# Patient Record
Sex: MALE | Race: WHITE | NOT HISPANIC OR LATINO | URBAN - METROPOLITAN AREA
[De-identification: names, ages, dates, MRNs, and addresses within clinical notes are randomized per-mention and may not be internally consistent; named-entity substitution may affect disease eponyms.]

---

## 2021-10-01 ENCOUNTER — EMERGENCY (EMERGENCY)
Facility: HOSPITAL | Age: 56
LOS: 1 days | Discharge: HOME | End: 2021-10-01
Attending: EMERGENCY MEDICINE | Admitting: EMERGENCY MEDICINE
Payer: COMMERCIAL

## 2021-10-01 VITALS
RESPIRATION RATE: 15 BRPM | WEIGHT: 279.99 LBS | DIASTOLIC BLOOD PRESSURE: 85 MMHG | SYSTOLIC BLOOD PRESSURE: 133 MMHG | OXYGEN SATURATION: 100 % | HEIGHT: 72 IN | HEART RATE: 67 BPM

## 2021-10-01 LAB
ALBUMIN SERPL ELPH-MCNC: 4 G/DL — SIGNIFICANT CHANGE UP (ref 3.3–5)
ALP SERPL-CCNC: 70 U/L — SIGNIFICANT CHANGE UP (ref 40–120)
ALT FLD-CCNC: 37 U/L — SIGNIFICANT CHANGE UP (ref 12–78)
ANION GAP SERPL CALC-SCNC: 3 MMOL/L — LOW (ref 5–17)
AST SERPL-CCNC: 22 U/L — SIGNIFICANT CHANGE UP (ref 15–37)
BASOPHILS # BLD AUTO: 0.02 K/UL — SIGNIFICANT CHANGE UP (ref 0–0.2)
BASOPHILS NFR BLD AUTO: 0.2 % — SIGNIFICANT CHANGE UP (ref 0–2)
BILIRUB SERPL-MCNC: 0.4 MG/DL — SIGNIFICANT CHANGE UP (ref 0.2–1.2)
BUN SERPL-MCNC: 18 MG/DL — SIGNIFICANT CHANGE UP (ref 7–23)
CALCIUM SERPL-MCNC: 8.6 MG/DL — SIGNIFICANT CHANGE UP (ref 8.5–10.1)
CHLORIDE SERPL-SCNC: 111 MMOL/L — HIGH (ref 96–108)
CK SERPL-CCNC: 118 U/L — SIGNIFICANT CHANGE UP (ref 26–308)
CO2 SERPL-SCNC: 26 MMOL/L — SIGNIFICANT CHANGE UP (ref 22–31)
CREAT SERPL-MCNC: 0.86 MG/DL — SIGNIFICANT CHANGE UP (ref 0.5–1.3)
D DIMER BLD IA.RAPID-MCNC: <150 NG/ML DDU — SIGNIFICANT CHANGE UP
EOSINOPHIL # BLD AUTO: 0.01 K/UL — SIGNIFICANT CHANGE UP (ref 0–0.5)
EOSINOPHIL NFR BLD AUTO: 0.1 % — SIGNIFICANT CHANGE UP (ref 0–6)
GLUCOSE SERPL-MCNC: 139 MG/DL — HIGH (ref 70–99)
HCT VFR BLD CALC: 39.5 % — SIGNIFICANT CHANGE UP (ref 39–50)
HGB BLD-MCNC: 13.2 G/DL — SIGNIFICANT CHANGE UP (ref 13–17)
IMM GRANULOCYTES NFR BLD AUTO: 0.4 % — SIGNIFICANT CHANGE UP (ref 0–1.5)
LYMPHOCYTES # BLD AUTO: 0.78 K/UL — LOW (ref 1–3.3)
LYMPHOCYTES # BLD AUTO: 8.6 % — LOW (ref 13–44)
MAGNESIUM SERPL-MCNC: 2.2 MG/DL — SIGNIFICANT CHANGE UP (ref 1.6–2.6)
MCHC RBC-ENTMCNC: 30.5 PG — SIGNIFICANT CHANGE UP (ref 27–34)
MCHC RBC-ENTMCNC: 33.4 GM/DL — SIGNIFICANT CHANGE UP (ref 32–36)
MCV RBC AUTO: 91.2 FL — SIGNIFICANT CHANGE UP (ref 80–100)
MONOCYTES # BLD AUTO: 0.36 K/UL — SIGNIFICANT CHANGE UP (ref 0–0.9)
MONOCYTES NFR BLD AUTO: 4 % — SIGNIFICANT CHANGE UP (ref 2–14)
NEUTROPHILS # BLD AUTO: 7.9 K/UL — HIGH (ref 1.8–7.4)
NEUTROPHILS NFR BLD AUTO: 86.7 % — HIGH (ref 43–77)
NRBC # BLD: 0 /100 WBCS — SIGNIFICANT CHANGE UP (ref 0–0)
PLATELET # BLD AUTO: 182 K/UL — SIGNIFICANT CHANGE UP (ref 150–400)
POTASSIUM SERPL-MCNC: 4.8 MMOL/L — SIGNIFICANT CHANGE UP (ref 3.5–5.3)
POTASSIUM SERPL-SCNC: 4.8 MMOL/L — SIGNIFICANT CHANGE UP (ref 3.5–5.3)
PROT SERPL-MCNC: 7.7 G/DL — SIGNIFICANT CHANGE UP (ref 6–8.3)
RBC # BLD: 4.33 M/UL — SIGNIFICANT CHANGE UP (ref 4.2–5.8)
RBC # FLD: 12.5 % — SIGNIFICANT CHANGE UP (ref 10.3–14.5)
SODIUM SERPL-SCNC: 140 MMOL/L — SIGNIFICANT CHANGE UP (ref 135–145)
TROPONIN I SERPL-MCNC: <.015 NG/ML — SIGNIFICANT CHANGE UP (ref 0.01–0.04)
WBC # BLD: 9.11 K/UL — SIGNIFICANT CHANGE UP (ref 3.8–10.5)
WBC # FLD AUTO: 9.11 K/UL — SIGNIFICANT CHANGE UP (ref 3.8–10.5)

## 2021-10-01 PROCEDURE — G1004: CPT

## 2021-10-01 PROCEDURE — 93010 ELECTROCARDIOGRAM REPORT: CPT

## 2021-10-01 PROCEDURE — 93005 ELECTROCARDIOGRAM TRACING: CPT

## 2021-10-01 PROCEDURE — 99284 EMERGENCY DEPT VISIT MOD MDM: CPT | Mod: 25

## 2021-10-01 PROCEDURE — 82550 ASSAY OF CK (CPK): CPT

## 2021-10-01 PROCEDURE — 80053 COMPREHEN METABOLIC PANEL: CPT

## 2021-10-01 PROCEDURE — 84484 ASSAY OF TROPONIN QUANT: CPT

## 2021-10-01 PROCEDURE — 71045 X-RAY EXAM CHEST 1 VIEW: CPT

## 2021-10-01 PROCEDURE — 85379 FIBRIN DEGRADATION QUANT: CPT

## 2021-10-01 PROCEDURE — 83735 ASSAY OF MAGNESIUM: CPT

## 2021-10-01 PROCEDURE — 70450 CT HEAD/BRAIN W/O DYE: CPT | Mod: 26,MG

## 2021-10-01 PROCEDURE — 70450 CT HEAD/BRAIN W/O DYE: CPT | Mod: MG

## 2021-10-01 PROCEDURE — 96374 THER/PROPH/DIAG INJ IV PUSH: CPT

## 2021-10-01 PROCEDURE — 99285 EMERGENCY DEPT VISIT HI MDM: CPT

## 2021-10-01 PROCEDURE — 71045 X-RAY EXAM CHEST 1 VIEW: CPT | Mod: 26

## 2021-10-01 PROCEDURE — 99284 EMERGENCY DEPT VISIT MOD MDM: CPT

## 2021-10-01 PROCEDURE — 85025 COMPLETE CBC W/AUTO DIFF WBC: CPT

## 2021-10-01 PROCEDURE — 36415 COLL VENOUS BLD VENIPUNCTURE: CPT

## 2021-10-01 RX ORDER — MECLIZINE HCL 12.5 MG
25 TABLET ORAL ONCE
Refills: 0 | Status: COMPLETED | OUTPATIENT
Start: 2021-10-01 | End: 2021-10-01

## 2021-10-01 RX ORDER — ONDANSETRON 8 MG/1
4 TABLET, FILM COATED ORAL ONCE
Refills: 0 | Status: COMPLETED | OUTPATIENT
Start: 2021-10-01 | End: 2021-10-01

## 2021-10-01 RX ADMIN — Medication 25 MILLIGRAM(S): at 16:30

## 2021-10-01 RX ADMIN — ONDANSETRON 4 MILLIGRAM(S): 8 TABLET, FILM COATED ORAL at 16:30

## 2021-10-01 NOTE — ED PROVIDER NOTE - PROGRESS NOTE DETAILS
Pt seen by yee Rivera to [facility], for otpt fu.  At length discussion with patient regarding nature of the patient's symptoms. Pt now asymptomatic, no acute co or changes. Discussed results of all diagnostic testing in ED. Discussed chest pain, Shortness of breath, Dizziness, syncope /  near syncope precautions and instructions. Patient demonstrates understanding that a cardiac cause of the symptoms has not been totally excluded, but at this time there is no evidence to warrant an inpatient workup.  Discussed the importance of continued follow-up with Cardiology as outpatient to complete workup.

## 2021-10-01 NOTE — CONSULT NOTE ADULT - SUBJECTIVE AND OBJECTIVE BOX
*****CHARTING IN PROGRESS*****      Patient is a 55y old  Male who presents with a chief complaint of     HPI: 56yo M, with no significant PMH, presents with dizziness and feeling as if he was in a fog. Earlier this morning, around 11am, patient was in his attic when his foot got stuck between beams and his leg slipped slightly through. He denies falling through the attic beams. His presenting symptoms began ~1pm and are associated with multiple episodes of nausea, vomiting, and diaphoresis.      PAST MEDICAL & SURGICAL HISTORY:  No pertinent past medical history        ECHO FINDINGS: No record of previous echo        MEDICATIONS  (STANDING):    MEDICATIONS  (PRN):      FAMILY HISTORY:  Denies Family history of CAD or early MI      Constitutional: denies fever, chills  HEENT: denies blurry vision, difficulty hearing  Respiratory: denies SOB, BARBOSA, cough  Cardiovascular: denies CP, palpitations, orthopnea, PND, LE edema  Gastrointestinal: denies nausea, vomiting, abdominal pain  Genitourinary: denies urinary changes  Skin: Denies rashes, itching  Neurologic: denies headache, weakness, dizziness  Hematology/Oncology: denies bleeding, easy bruising  ROS negative except as noted above      SOCIAL HISTORY:  No tobacco, Alcohol or Drug use    Vital Signs Last 24 Hrs  T(C): --  T(F): --  HR: 67 (01 Oct 2021 15:04) (67 - 67)  BP: 133/85 (01 Oct 2021 15:04) (133/85 - 133/85)  BP(mean): --  RR: 15 (01 Oct 2021 15:04) (15 - 15)  SpO2: 100% (01 Oct 2021 15:04) (100% - 100%)    Physical Exam:  General: Well developed, well nourished, NAD  HEENT: NCAT, PERRLA, EOMI bl, moist mucous membranes   Neck: Supple, nontender, no mass  Neurology: A&Ox3, nonfocal, sensation intact   Respiratory: CTA B/L, No W/R/R  CV: RRR, +S1/S2, no murmurs, rubs or gallops  Abdominal: Soft, NT, ND +BSx4, no palpable masses  Extremities: No C/C/E, + peripheral pulses  MSK: Normal ROM, no joint erythema or warmth, no joint swelling   Heme: No obvious ecchymosis or petechiae   Skin: warm, dry, normal color      ECG:      LABS:  Complete Blood Count + Automated Diff (10.01.21 @ 16:42)   WBC Count: 9.11 K/uL   RBC Count: 4.33 M/uL   Hemoglobin: 13.2 g/dL   Hematocrit: 39.5 %   Mean Cell Volume: 91.2 fl   Mean Cell Hemoglobin: 30.5 pg   Mean Cell Hemoglobin Conc: 33.4 gm/dL   Red Cell Distrib Width: 12.5 %   Platelet Count - Automated: 182 K/uL       RADIOLOGY & ADDITIONAL STUDIES:   *****CHARTING IN PROGRESS*****      Patient is a 55y old  Male who presents with a chief complaint of     HPI: 56yo M, with no significant PMH, presents with dizziness and feeling as if he was in a fog. Earlier this morning, around 11am, patient was in his attic when his foot got stuck between beams and his leg slipped slightly through it. He denies falling through the attic beams. Pt denies any head trauma or LOC. His presenting symptoms began ~1pm and are associated with multiple episodes of nausea, vomiting, and diaphoresis.       PAST MEDICAL & SURGICAL HISTORY:  No pertinent past medical history        ECHO FINDINGS: No record of previous echo        MEDICATIONS  (STANDING):    MEDICATIONS  (PRN):      FAMILY HISTORY:  Denies Family history of CAD or early MI      Constitutional: denies fever, chills, admits diaphoresis   HEENT: denies blurry vision, difficulty hearing  Respiratory: denies SOB, BARBOSA, cough  Cardiovascular: denies CP, palpitations, orthopnea, PND, LE edema  Gastrointestinal: admits nausea, vomiting, denies abdominal pain  Genitourinary: denies urinary changes  Skin: Denies rashes, itching  Neurologic: denies headache, weakness, admits dizziness and lightheadedness  Hematology/Oncology: denies bleeding, easy bruising  ROS negative except as noted above      SOCIAL HISTORY:  No tobacco, Alcohol or Drug use    Vital Signs Last 24 Hrs  T(C): --  T(F): --  HR: 67 (01 Oct 2021 15:04) (67 - 67)  BP: 133/85 (01 Oct 2021 15:04) (133/85 - 133/85)  BP(mean): --  RR: 15 (01 Oct 2021 15:04) (15 - 15)  SpO2: 100% (01 Oct 2021 15:04) (100% - 100%)    Physical Exam:  General: Well developed, well nourished, NAD  HEENT: NCAT, PERRLA, EOMI bl, moist mucous membranes   Neck: Supple, nontender, no mass  Neurology: A&Ox3, nonfocal, sensation intact   Respiratory: CTA B/L, No W/R/R  CV: RRR, +S1/S2, no murmurs, rubs or gallops  Abdominal: Soft, NT, ND +BSx4, no palpable masses  Extremities: No C/C/E, + peripheral pulses  MSK: Normal ROM, no joint erythema or warmth, no joint swelling   Heme: No obvious ecchymosis or petechiae   Skin: warm, dry, normal color      ECG: NSR, HR: 69, no signs of ischemia       LABS:  Complete Blood Count + Automated Diff (10.01.21 @ 16:42)   WBC Count: 9.11 K/uL   RBC Count: 4.33 M/uL   Hemoglobin: 13.2 g/dL   Hematocrit: 39.5 %   Mean Cell Volume: 91.2 fl   Mean Cell Hemoglobin: 30.5 pg   Mean Cell Hemoglobin Conc: 33.4 gm/dL   Red Cell Distrib Width: 12.5 %   Platelet Count - Automated: 182 K/uL       RADIOLOGY & ADDITIONAL STUDIES:   *****CHARTING IN PROGRESS*****      Patient is a 55y old  Male who presents with a chief complaint of     HPI: 56yo M, with no significant PMH, presents with dizziness and feeling as if he was in a fog. Earlier this morning, around 11am, patient was in his attic when his foot got stuck between beams and his leg slipped slightly through it. He denies falling through the attic beams. Pt denies any head trauma or LOC. His presenting symptoms began ~1pm and are associated with multiple episodes of nausea, vomiting, and diaphoresis.       PAST MEDICAL & SURGICAL HISTORY:  No pertinent past medical history        ECHO FINDINGS: No record of previous echo        MEDICATIONS  (STANDING):    MEDICATIONS  (PRN):      FAMILY HISTORY:  Denies Family history of CAD or early MI      Constitutional: denies fever, chills, admits diaphoresis   HEENT: denies blurry vision, difficulty hearing  Respiratory: denies SOB, BARBOSA, cough  Cardiovascular: denies CP, palpitations, orthopnea, PND, LE edema  Gastrointestinal: admits nausea, vomiting, denies abdominal pain  Genitourinary: denies urinary changes  Skin: Denies rashes, itching  Neurologic: denies headache, weakness, admits dizziness and lightheadedness  Hematology/Oncology: denies bleeding, easy bruising  ROS negative except as noted above      SOCIAL HISTORY:  No tobacco, Alcohol or Drug use    Vital Signs Last 24 Hrs  T(C): --  T(F): --  HR: 67 (01 Oct 2021 15:04) (67 - 67)  BP: 133/85 (01 Oct 2021 15:04) (133/85 - 133/85)  BP(mean): --  RR: 15 (01 Oct 2021 15:04) (15 - 15)  SpO2: 100% (01 Oct 2021 15:04) (100% - 100%)    Physical Exam:  General: Well developed, well nourished, NAD  HEENT: NCAT, PERRLA, EOMI bl, moist mucous membranes   Neck: Supple, nontender, no mass  Neurology: A&Ox3, nonfocal, sensation intact   Respiratory: CTA B/L, No W/R/R  CV: RRR, +S1/S2, no murmurs, rubs or gallops  Abdominal: Soft, NT, ND +BSx4, no palpable masses  Extremities: No C/C/E, + peripheral pulses  MSK: Normal ROM, no joint erythema or warmth, no joint swelling   Heme: No obvious ecchymosis or petechiae   Skin: warm, dry, normal color      ECG: NSR, HR: 69, no signs of ischemia       LABS:  Complete Blood Count + Automated Diff (10.01.21 @ 16:42)   WBC Count: 9.11 K/uL   RBC Count: 4.33 M/uL   Hemoglobin: 13.2 g/dL   Hematocrit: 39.5 %   Mean Cell Volume: 91.2 fl   Mean Cell Hemoglobin: 30.5 pg   Mean Cell Hemoglobin Conc: 33.4 gm/dL   Red Cell Distrib Width: 12.5 %   Platelet Count - Automated: 182 K/uL   Comprehensive Metabolic Panel (10.01.21 @ 16:42)   Sodium, Serum: 140 mmol/L   Potassium, Serum: 4.8 mmol/L   Chloride, Serum: 111 mmol/L   Carbon Dioxide, Serum: 26 mmol/L   Anion Gap, Serum: 3 mmol/L   Blood Urea Nitrogen, Serum: 18 mg/dL   Creatinine, Serum: 0.86 mg/dL   Glucose, Serum: 139 mg/dL   Calcium, Total Serum: 8.6 mg/dL   Protein Total, Serum: 7.7 g/dL   Albumin, Serum: 4.0 g/dL   Bilirubin Total, Serum: 0.4 mg/dL   Alkaline Phosphatase, Serum: 70 U/L   Aspartate Aminotransferase (AST/SGOT): 22 U/L   Alanine Aminotransferase (ALT/SGPT): 37 U/L   eGFR if Non : 99  eGFR if : 115 mL/min/1.73M2     Troponin I, Serum: <.015      RADIOLOGY & ADDITIONAL STUDIES:   *****CHARTING IN PROGRESS*****      Patient is a 55y old  Male who presents with a chief complaint of     HPI: 56yo M, with no significant PMH, presents with dizziness and feeling as if he was in a fog. Earlier this morning, around 11am, patient was in his attic when his foot got stuck between beams and his leg slipped slightly through it. He denies falling through the attic beams. Pt denies any head trauma or LOC. His presenting symptoms began ~1pm and are associated with multiple episodes of nausea, vomiting, and diaphoresis.       PAST MEDICAL & SURGICAL HISTORY:  No pertinent past medical history        ECHO FINDINGS: No record of previous echo        MEDICATIONS  (STANDING):    MEDICATIONS  (PRN):      FAMILY HISTORY:  Denies Family history of CAD or early MI      Constitutional: denies fever, chills, admits diaphoresis   HEENT: denies blurry vision, difficulty hearing  Respiratory: denies SOB, BARBOSA, cough  Cardiovascular: denies CP, palpitations, orthopnea, PND, LE edema  Gastrointestinal: admits nausea, vomiting, denies abdominal pain  Genitourinary: denies urinary changes  Skin: Denies rashes, itching  Neurologic: denies headache, weakness, admits dizziness and lightheadedness  Hematology/Oncology: denies bleeding, easy bruising  ROS negative except as noted above      SOCIAL HISTORY:  No tobacco, Alcohol or Drug use    Vital Signs Last 24 Hrs  T(C): --  T(F): --  HR: 67 (01 Oct 2021 15:04) (67 - 67)  BP: 133/85 (01 Oct 2021 15:04) (133/85 - 133/85)  BP(mean): --  RR: 15 (01 Oct 2021 15:04) (15 - 15)  SpO2: 100% (01 Oct 2021 15:04) (100% - 100%)    Physical Exam:  General: Well developed, well nourished, NAD  HEENT: NCAT, PERRLA, EOMI bl, moist mucous membranes   Neck: Supple, nontender, no mass  Neurology: A&Ox3, nonfocal, sensation intact   Respiratory: CTA B/L, No W/R/R  CV: RRR, +S1/S2, no murmurs, rubs or gallops  Abdominal: Soft, NT, ND +BSx4, no palpable masses  Extremities: No C/C/E, + peripheral pulses  MSK: Normal ROM, no joint erythema or warmth, no joint swelling   Heme: No obvious ecchymosis or petechiae   Skin: warm, dry, normal color      ECG: NSR, HR: 69, no signs of ischemia       LABS:  Complete Blood Count + Automated Diff (10.01.21 @ 16:42)   WBC Count: 9.11 K/uL   RBC Count: 4.33 M/uL   Hemoglobin: 13.2 g/dL   Hematocrit: 39.5 %   Mean Cell Volume: 91.2 fl   Mean Cell Hemoglobin: 30.5 pg   Mean Cell Hemoglobin Conc: 33.4 gm/dL   Red Cell Distrib Width: 12.5 %   Platelet Count - Automated: 182 K/uL   Comprehensive Metabolic Panel (10.01.21 @ 16:42)   Sodium, Serum: 140 mmol/L   Potassium, Serum: 4.8 mmol/L   Chloride, Serum: 111 mmol/L   Carbon Dioxide, Serum: 26 mmol/L   Anion Gap, Serum: 3 mmol/L   Blood Urea Nitrogen, Serum: 18 mg/dL   Creatinine, Serum: 0.86 mg/dL   Glucose, Serum: 139 mg/dL   Calcium, Total Serum: 8.6 mg/dL   Protein Total, Serum: 7.7 g/dL   Albumin, Serum: 4.0 g/dL   Bilirubin Total, Serum: 0.4 mg/dL   Alkaline Phosphatase, Serum: 70 U/L   Aspartate Aminotransferase (AST/SGOT): 22 U/L   Alanine Aminotransferase (ALT/SGPT): 37 U/L   eGFR if Non : 99  eGFR if : 115 mL/min/1.73M2     Troponin I, Serum: <.015      RADIOLOGY & ADDITIONAL STUDIES:    < from: CT Head No Cont (10.01.21 @ 16:52) >  EXAM:  CT BRAIN                            PROCEDURE DATE:  10/01/2021          INTERPRETATION:  CLINICAL INDICATIONS:  Dizziness, non-specific    COMPARISON: None.    TECHNIQUE: Noncontrast CT of the head. Multiplanar reformations are submitted.    FINDINGS:  There is no compelling evidence for an acute transcortical infarction. There is no evidence of mass, mass effect, midline shift or extra-axial fluid collection. The lateral ventricles and cortical sulci are age-appropriate in size and configuration. The orbits, mastoid air cells and visualized paranasal sinuses are unremarkable. The calvarium is intact. Consider MRI as clinically warranted.    IMPRESSION: No evidence of an acute transcortical infarction or hemorrhage.    --- End of Report ---      < end of copied text >     *****CHARTING IN PROGRESS*****      Patient is a 55y old  Male who presents with a chief complaint of dizziness.     HPI: 56yo M, with no significant PMH, presents with the feeling as if he was "in a fog" and in a "drunk state." Earlier this morning, around 11am, patient was in his attic when his foot got stuck between beams and his leg slipped slightly through it. He denies falling through the attic beams. Pt denies any head trauma or LOC. He then started fixing the attic. Around 1pm while resting on his couch he felt "this daze state" with nausea, vomiting x3 and diaphoresis. Pt's wife witnessed this and state that the patient was very diaphoretic and looked very pale. Wife checked pt's BP which was elevated 160/90. His SBP is usually between 120-130s. This sensation was worse when he turned his head. When he tried to lay down he felt nauseous. Denies any dizziness, lightheadedness or vision changes. Pt has been staying hydrated, no changes in diet recently.   Pt does not follow a cardiologist, had a stress test in 2012 when he had his gastric sleeve done which was negative. Pt does note that he has been more stressed than usual, he is helping move his daughter to [place] tomorrow and he was very upset when the attic broke this morning. Pt denies any CP, SOB, palpitations, numbness/tingling, headache, dizziness, lower leg swelling, fever, chills.   No sick contacts no recent travels.   Pt was given Meclizine 25 mg x1 and Zofran 4 mg IVP and states that his symptoms have improved.       PAST MEDICAL & SURGICAL HISTORY:  No pertinent past medical history        ECHO FINDINGS: No record of previous echo        MEDICATIONS  (STANDING):    MEDICATIONS  (PRN):      FAMILY HISTORY:  Denies Family history of CAD or early MI      Constitutional: denies fever, chills, admits diaphoresis   HEENT: denies blurry vision, difficulty hearing  Respiratory: denies SOB, BARBOSA, cough  Cardiovascular: denies CP, palpitations, orthopnea, PND, LE edema  Gastrointestinal: admits nausea, vomiting, denies abdominal pain  Genitourinary: denies urinary changes  Skin: Denies rashes, itching  Neurologic: denies headache, weakness, dizziness and lightheadedness, admits "fog state of mind"   Hematology/Oncology: denies bleeding, easy bruising  ROS negative except as noted above      SOCIAL HISTORY:  No tobacco, Alcohol or Drug use    Vital Signs Last 24 Hrs  T(C): --  T(F): --  HR: 67 (01 Oct 2021 15:04) (67 - 67)  BP: 133/85 (01 Oct 2021 15:04) (133/85 - 133/85)  BP(mean): --  RR: 15 (01 Oct 2021 15:04) (15 - 15)  SpO2: 100% (01 Oct 2021 15:04) (100% - 100%)    Physical Exam:  General: Well developed, well nourished, NAD, pleasant male   HEENT: NCAT, PERRLA, EOMI bl, moist mucous membranes   Neck: Supple, nontender, no mass  Neurology: A&Ox3, nonfocal, sensation intact   Respiratory: CTA B/L, No W/R/R  CV: RRR, +S1/S2, no murmurs, rubs or gallops  Abdominal: Soft, NT, ND +BSx4, no palpable masses  Extremities: No C/C/E, + peripheral pulses      ECG: NSR, HR: 69, no signs of ischemia       LABS:  Complete Blood Count + Automated Diff (10.01.21 @ 16:42)   WBC Count: 9.11 K/uL   RBC Count: 4.33 M/uL   Hemoglobin: 13.2 g/dL   Hematocrit: 39.5 %   Mean Cell Volume: 91.2 fl   Mean Cell Hemoglobin: 30.5 pg   Mean Cell Hemoglobin Conc: 33.4 gm/dL   Red Cell Distrib Width: 12.5 %   Platelet Count - Automated: 182 K/uL   Comprehensive Metabolic Panel (10.01.21 @ 16:42)   Sodium, Serum: 140 mmol/L   Potassium, Serum: 4.8 mmol/L   Chloride, Serum: 111 mmol/L   Carbon Dioxide, Serum: 26 mmol/L   Anion Gap, Serum: 3 mmol/L   Blood Urea Nitrogen, Serum: 18 mg/dL   Creatinine, Serum: 0.86 mg/dL   Glucose, Serum: 139 mg/dL   Calcium, Total Serum: 8.6 mg/dL   Protein Total, Serum: 7.7 g/dL   Albumin, Serum: 4.0 g/dL   Bilirubin Total, Serum: 0.4 mg/dL   Alkaline Phosphatase, Serum: 70 U/L   Aspartate Aminotransferase (AST/SGOT): 22 U/L   Alanine Aminotransferase (ALT/SGPT): 37 U/L   eGFR if Non : 99  eGFR if : 115 mL/min/1.73M2     Troponin I, Serum: <.015      RADIOLOGY & ADDITIONAL STUDIES:    < from: CT Head No Cont (10.01.21 @ 16:52) >  EXAM:  CT BRAIN                            PROCEDURE DATE:  10/01/2021          INTERPRETATION:  CLINICAL INDICATIONS:  Dizziness, non-specific    COMPARISON: None.    TECHNIQUE: Noncontrast CT of the head. Multiplanar reformations are submitted.    FINDINGS:  There is no compelling evidence for an acute transcortical infarction. There is no evidence of mass, mass effect, midline shift or extra-axial fluid collection. The lateral ventricles and cortical sulci are age-appropriate in size and configuration. The orbits, mastoid air cells and visualized paranasal sinuses are unremarkable. The calvarium is intact. Consider MRI as clinically warranted.    IMPRESSION: No evidence of an acute transcortical infarction or hemorrhage.           Patient is a 55y old  Male who presents with a chief complaint of dizziness.     HPI: 54yo M, with no significant PMH, presents with the feeling as if he was "in a fog" and in a "drunk state." Earlier this morning, around 11am, patient was in his attic when his sandal got stuck between beams and his leg slipped slightly through it. He denies falling through the attic beams, he landed on the attic beams. Pt denies any head trauma or LOC. He then started fixing the attic. Around 1pm while resting on his couch he felt "this daze state" with nausea, vomiting x3 and diaphoresis. Pt's wife witnessed this and state that the patient was very diaphoretic and looked very pale. Wife checked pt's BP which was elevated 160/90. His SBP is usually between 120-130s. This sensation was worse when he turned his head. When he tried to lay down he felt nauseous. Denies any dizziness, lightheadedness or vision changes. Pt has been staying hydrated, no changes in diet recently.   Pt does not follow a cardiologist, had a stress test in 2012 when he had his gastric sleeve done which was negative. He has gotten routine ekgs and echos done which have been normal. Pt does note that he has been more stressed than usual, he is helping move his daughter to [place] tomorrow and he was very upset when the attic broke this morning. Pt denies any CP, SOB, palpitations, numbness/tingling, headache, dizziness, lower leg swelling, fever, chills.   No sick contacts no recent travels.   Pt was given Meclizine 25 mg x1 and Zofran 4 mg IVP and states that his symptoms have improved.       PAST MEDICAL & SURGICAL HISTORY:  No pertinent past medical history        ECHO FINDINGS: No record of previous echo        MEDICATIONS  (STANDING):    MEDICATIONS  (PRN):      FAMILY HISTORY:  Denies Family history of CAD or early MI      Constitutional: denies fever, chills, admits diaphoresis   HEENT: denies blurry vision, difficulty hearing  Respiratory: denies SOB, BARBOSA, cough  Cardiovascular: denies CP, palpitations, orthopnea, PND, LE edema  Gastrointestinal: admits nausea, vomiting, denies abdominal pain  Genitourinary: denies urinary changes  Skin: Denies rashes, itching  Neurologic: denies headache, weakness, dizziness and lightheadedness, admits "fog state of mind"   Hematology/Oncology: denies bleeding, easy bruising  ROS negative except as noted above      SOCIAL HISTORY:  No tobacco, Alcohol or Drug use    Vital Signs Last 24 Hrs  T(C): --  T(F): --  HR: 67 (01 Oct 2021 15:04) (67 - 67)  BP: 133/85 (01 Oct 2021 15:04) (133/85 - 133/85)  BP(mean): --  RR: 15 (01 Oct 2021 15:04) (15 - 15)  SpO2: 100% (01 Oct 2021 15:04) (100% - 100%)    Physical Exam:  General: Well developed, well nourished, NAD, pleasant male   HEENT: NCAT, PERRLA, EOMI bl, moist mucous membranes   Neck: Supple, nontender, no mass  Neurology: A&Ox3, nonfocal, sensation intact   Respiratory: CTA B/L, No W/R/R  CV: RRR, +S1/S2, no murmurs, rubs or gallops  Abdominal: Soft, NT, ND +BSx4, no palpable masses  Extremities: No C/C/E, + peripheral pulses      ECG: NSR, HR: 69, no signs of ischemia       LABS:  Complete Blood Count + Automated Diff (10.01.21 @ 16:42)   WBC Count: 9.11 K/uL   RBC Count: 4.33 M/uL   Hemoglobin: 13.2 g/dL   Hematocrit: 39.5 %   Mean Cell Volume: 91.2 fl   Mean Cell Hemoglobin: 30.5 pg   Mean Cell Hemoglobin Conc: 33.4 gm/dL   Red Cell Distrib Width: 12.5 %   Platelet Count - Automated: 182 K/uL   Comprehensive Metabolic Panel (10.01.21 @ 16:42)   Sodium, Serum: 140 mmol/L   Potassium, Serum: 4.8 mmol/L   Chloride, Serum: 111 mmol/L   Carbon Dioxide, Serum: 26 mmol/L   Anion Gap, Serum: 3 mmol/L   Blood Urea Nitrogen, Serum: 18 mg/dL   Creatinine, Serum: 0.86 mg/dL   Glucose, Serum: 139 mg/dL   Calcium, Total Serum: 8.6 mg/dL   Protein Total, Serum: 7.7 g/dL   Albumin, Serum: 4.0 g/dL   Bilirubin Total, Serum: 0.4 mg/dL   Alkaline Phosphatase, Serum: 70 U/L   Aspartate Aminotransferase (AST/SGOT): 22 U/L   Alanine Aminotransferase (ALT/SGPT): 37 U/L   eGFR if Non : 99  eGFR if : 115 mL/min/1.73M2     Troponin I, Serum: <.015      RADIOLOGY & ADDITIONAL STUDIES:    < from: CT Head No Cont (10.01.21 @ 16:52) >  EXAM:  CT BRAIN                            PROCEDURE DATE:  10/01/2021          INTERPRETATION:  CLINICAL INDICATIONS:  Dizziness, non-specific    COMPARISON: None.    TECHNIQUE: Noncontrast CT of the head. Multiplanar reformations are submitted.    FINDINGS:  There is no compelling evidence for an acute transcortical infarction. There is no evidence of mass, mass effect, midline shift or extra-axial fluid collection. The lateral ventricles and cortical sulci are age-appropriate in size and configuration. The orbits, mastoid air cells and visualized paranasal sinuses are unremarkable. The calvarium is intact. Consider MRI as clinically warranted.    IMPRESSION: No evidence of an acute transcortical infarction or hemorrhage.

## 2021-10-01 NOTE — ED PROVIDER NOTE - NSFOLLOWUPINSTRUCTIONS_ED_ALL_ED_FT
1)  Follow-up with your Primary Medical Doctor or referred doctor. Call today / next business day for prompt follow-up.  2) Follow-up with Cardiology as discussed. Call today / next business day for prompt follow-up and further workup and evaluation.  3) Return to Emergency room for any worsening or persistent pain, shortness of breath, weakness, fever, abdominal pain, dizziness, passing out, unexplained pain in arms, legs, back, any vomiting, feeling like your heart is racing,  or any other concerning symptoms.  4) See attached instruction sheets for additional information, including information regarding signs and symptoms to look out for, reasons to seek immediate care and other important instructions.  5) Follow-up with Neurology, call next business day to arrange follow-up

## 2021-10-01 NOTE — ED PROVIDER NOTE - PATIENT PORTAL LINK FT
You can access the FollowMyHealth Patient Portal offered by [facility] by registering at the following website: [email or web address] By joining [facility]’s FollowMyHealth portal, you will also be able to view your health information using other applications (apps) compatible with our system.

## 2021-10-01 NOTE — ED PROVIDER NOTE - CARE PROVIDER_API CALL
Ross Mathew  INTERNAL MEDICINE  [address]  [place] [address]  Phone: [phone]  Fax: [phone]  Follow Up Time:     Franck Naidu)  Cardio [facility]  [address] [place] [address]  Phone: [phone]  Fax: [phone]  Follow Up Time:     Leland Olmstead  Neurology  [facility], [address]  [place] [address]  Phone: [phone]  Fax: [phone]  Follow Up Time:

## 2021-10-01 NOTE — ED PROVIDER NOTE - OBJECTIVE STATEMENT
54 yo M p/w co feeling dizzy, as if he was in a fog. Pt with mult episodes of nausea, vomited. Assoc with some assoc diaphoresis. no chest pains, no SOB. no fever/chills. no recent illness. no cough/ uri. Pt was in attic today when his foot stepped between beams and his leg went slightly through ceiling. Pt did NOT fall, no truncal trauma. no head inj / loc. Pt states this occurred at 11am, was in nl state of health until onset of above sx at 1pm. No prior episodes. No cough/ uri. Pt fully vaccinated, no covid exposures. no agg/allev factors. no other inj or co. no other trauma. Pt states now still feels slightly in fog, otherwise no acute sx at this time. No neck pain / stiffness / photophobia. no other acute co.   Pt states at home, his BP was ~ 170/100, improved now.

## 2021-10-01 NOTE — CONSULT NOTE ADULT - ASSESSMENT
*****CHARTING IN PROGRESS*****    54yo M, with no significant PMH, presents with dizziness associated with multiple episodes of nausea, vomiting, and diaphoresis.  *****CHARTING IN PROGRESS*****    54 yo M with no significant PMH, presents with dizziness associated with multiple episodes of nausea, vomiting, and diaphoresis.   Cardiology consulted for dizziness r/o cardiac etiology.     Cardiology consulted for       - No clear evidence of acute ischemia, trops negative x 1.  No need to further trend.   - EKG: NSR, HR: 69, no signs of ischemia, no previous EKG to compare to.   - No meaningful evidence of volume overload.  - No previous echocardiogram on record   - Pt had an episode of an elevated blood pressure at home, BP stable currently   - Monitor routine hemodynamics      #Dizziness  - Pt had symptoms of dizziness, diaphoresis, nausea and vomiting. No chest pain, SOB, palpitations. Less likely cardiac in nature   - Head CT showed     - F/u TTE and carotid dopplers  - F/u TSH, A1C  - Obtain orthostatic vitals  - Fall precautions        - Monitor and replete lytes, keep K>4, Mg>2  - Strict I/Os, daily weights.  - Other cardiovascular workup will depend on clinical course.  - All other workup per primary team  - Will continue to follow                 *****CHARTING IN PROGRESS*****    56 yo M with no significant PMH, presents with dizziness associated with multiple episodes of nausea, vomiting, and diaphoresis.   Cardiology consulted for fog state of mind with nausea and diaphoresis, r/o cardiac etiology.     - Pt had symptoms of "fog state of mind," diaphoresis, nausea and vomiting. Symptoms are worse with head movement. No chest pain, SOB, palpitations. Less likely cardiac in nature.    - Head CT was negative for infarction or hemorrhage   - No clear evidence of acute ischemia, trops negative x 1.  No need to further trend.   - EKG: NSR, HR: 69, no signs of ischemia, no arrhythmia, no previous EKG to compare to.   - No meaningful evidence of volume overload.  - No previous echocardiogram on record   - Pt had an episode of an elevated blood pressure at home, BP stable currently   - Obtain orthostatic vitals  - Fall precautions        - Monitor and replete lytes, keep K>4, Mg>2  - Other cardiovascular workup will depend on clinical course.  - All other workup per primary team  - Will continue to follow                 56 yo M with no significant PMH, presents with a "fog state of mind" associated with multiple episodes of nausea, vomiting, and diaphoresis.   Cardiology consulted for fog state of mind with nausea and diaphoresis, r/o cardiac etiology; and EKG findings.      - Pt had symptoms of a "fog state of mind," diaphoresis, nausea and vomiting. Symptoms are worse with head movement. No chest pain, SOB, palpitations. Less likely cardiac in nature, most likely vasovagal   - Head CT was negative for infarction or hemorrhage   - No clear evidence of acute ischemia, trops negative x 1. No need to further trend.   - EKG: NSR, HR: 69, no signs of ischemia, no arrhythmia, QT is not prolonged, EKG machine overestimated QT and QTC.   - No meaningful evidence of volume overload.  - Pt had an episode of an elevated blood pressure at home, BP stable currently; this episode of an elevated BP was most likely after vagal episode.        - Patient is stable for discharge from a cardiac standpoint with outpatient follow up with PCP.                  54 yo M with no significant PMH, presents with a "fog state of mind" associated with multiple episodes of nausea, vomiting, and diaphoresis.   Cardiology consulted for fog state of mind with nausea and diaphoresis, r/o cardiac etiology; and EKG findings.      - Pt had symptoms of a "fog state of mind," diaphoresis, nausea and vomiting. Symptoms are worse with head movement. No chest pain, SOB, palpitations. Less likely cardiac in nature, most likely vasovagal   - Head CT was negative for infarction or hemorrhage   - No clear evidence of acute ischemia, trops negative x 1. No need to further trend.   - EKG: NSR, HR: 69, no signs of ischemia, no arrhythmia, QT is not prolonged, EKG machine overestimated QT and QTC.   - No meaningful evidence of volume overload.  - Pt had an episode of an elevated blood pressure at home, BP stable currently; this episode of an elevated BP was most likely after vagal episode.        - Patient is stable for discharge from a cardiac standpoint with outpatient follow up with PCP.

## 2021-10-01 NOTE — ED PROVIDER NOTE - CARE PROVIDERS DIRECT ADDRESSES
[email or web address]

## 2021-10-01 NOTE — ED ADULT TRIAGE NOTE - CHIEF COMPLAINT QUOTE
Pt states" I fell  through the attic  after I tripped with my flip flop on. I was wedge between the floor s/p diaphoretic , lightheaded, . P t states" I feel like I'm in a dream state". Pt denies hitting his head.

## 2021-10-01 NOTE — CONSULT NOTE ADULT - ATTENDING COMMENTS
Likely a vagal event.  Doubt any significant ischemic disease or arrhythmia.  To follow with PMD as outpatient.

## 2021-10-01 NOTE — ED PROVIDER NOTE - PROVIDER TOKENS
PROVIDER:[TOKEN:[[id number]]],PROVIDER:[TOKEN:[[id number]]],PROVIDER:[TOKEN:[[id number]]]
